# Patient Record
Sex: FEMALE | Race: BLACK OR AFRICAN AMERICAN | URBAN - NONMETROPOLITAN AREA
[De-identification: names, ages, dates, MRNs, and addresses within clinical notes are randomized per-mention and may not be internally consistent; named-entity substitution may affect disease eponyms.]

---

## 2023-04-12 ENCOUNTER — CLAIMS CREATED FROM THE CLAIM WINDOW (OUTPATIENT)
Dept: URBAN - NONMETROPOLITAN AREA CLINIC 2 | Facility: CLINIC | Age: 23
End: 2023-04-12
Payer: COMMERCIAL

## 2023-04-12 ENCOUNTER — LAB OUTSIDE AN ENCOUNTER (OUTPATIENT)
Dept: URBAN - NONMETROPOLITAN AREA CLINIC 2 | Facility: CLINIC | Age: 23
End: 2023-04-12

## 2023-04-12 ENCOUNTER — WEB ENCOUNTER (OUTPATIENT)
Dept: URBAN - NONMETROPOLITAN AREA CLINIC 2 | Facility: CLINIC | Age: 23
End: 2023-04-12

## 2023-04-12 DIAGNOSIS — R10.13 DYSPEPSIA: ICD-10-CM

## 2023-04-12 DIAGNOSIS — R14.0 ABDOMINAL BLOATING: ICD-10-CM

## 2023-04-12 DIAGNOSIS — R11.0 NAUSEA: ICD-10-CM

## 2023-04-12 DIAGNOSIS — R10.13 EPIGASTRIC ABDOMINAL PAIN: ICD-10-CM

## 2023-04-12 PROBLEM — 79922009: Status: ACTIVE | Noted: 2023-04-12

## 2023-04-12 PROCEDURE — 99244 OFF/OP CNSLTJ NEW/EST MOD 40: CPT

## 2023-04-12 PROCEDURE — 99204 OFFICE O/P NEW MOD 45 MIN: CPT

## 2023-04-12 NOTE — HPI-TODAY'S VISIT:
4/12/23 Vanessa presents for evaluation of epigastric discomfort referred to our office by Dr. Vinny Boone from [facility]. A copy of this note and recommendations will be forwarded to their office.  She states an onset in December of an inability to eat large portions at meals complete meals.  She will start eating and her stomach gets full feels like her brain is telling her she is still hungry and she continues to eat however after continuing to eat more and feeling her stomach even more this uncomfortable with epigastric bloating and pressure. Worse when she tried to increase to full plates of chicken, rice and pasta.  She was started on Prevacid at the [facility], however she states this worsened her symptoms as she noticed when she would get very full on the Prevacid she would feel nauseated. She denies odynophagia, dysphagia or symptoms of reflux including heartburn. 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 She takes no other medications other than cetirizine for allergies which she has been on for years. She has regular bowel movements that are reported normal daily, improved after starting L-glutamine supplement. She will be here for the summer, travelling occassionally to see her mom in [place]. Has plans to  go to sonography school after undergrad.  Declines Zofran for nausea. SP

## 2023-05-04 ENCOUNTER — TELEPHONE ENCOUNTER (OUTPATIENT)
Dept: URBAN - METROPOLITAN AREA CLINIC 35 | Facility: CLINIC | Age: 23
End: 2023-05-04

## 2023-06-05 ENCOUNTER — TELEPHONE ENCOUNTER (OUTPATIENT)
Dept: URBAN - NONMETROPOLITAN AREA CLINIC 2 | Facility: CLINIC | Age: 23
End: 2023-06-05

## 2023-06-18 ENCOUNTER — TELEPHONE ENCOUNTER (OUTPATIENT)
Dept: URBAN - METROPOLITAN AREA CLINIC 96 | Facility: CLINIC | Age: 23
End: 2023-06-18

## 2023-06-18 PROBLEM — 235675006: Status: ACTIVE | Noted: 2023-06-18

## 2023-07-11 ENCOUNTER — OFFICE VISIT (OUTPATIENT)
Dept: URBAN - NONMETROPOLITAN AREA CLINIC 2 | Facility: CLINIC | Age: 23
End: 2023-07-11
Payer: COMMERCIAL

## 2023-07-11 ENCOUNTER — DASHBOARD ENCOUNTERS (OUTPATIENT)
Age: 23
End: 2023-07-11

## 2023-07-11 VITALS
DIASTOLIC BLOOD PRESSURE: 77 MMHG | HEIGHT: 65 IN | SYSTOLIC BLOOD PRESSURE: 118 MMHG | WEIGHT: 130.2 LBS | BODY MASS INDEX: 21.69 KG/M2 | HEART RATE: 90 BPM

## 2023-07-11 DIAGNOSIS — R14.0 ABDOMINAL BLOATING: ICD-10-CM

## 2023-07-11 DIAGNOSIS — K31.84 GASTROPARESIS: ICD-10-CM

## 2023-07-11 DIAGNOSIS — R10.13 EPIGASTRIC ABDOMINAL PAIN: ICD-10-CM

## 2023-07-11 DIAGNOSIS — R11.0 NAUSEA: ICD-10-CM

## 2023-07-11 PROBLEM — 162031009: Status: ACTIVE | Noted: 2023-04-12

## 2023-07-11 PROBLEM — 116289008: Status: ACTIVE | Noted: 2023-04-12

## 2023-07-11 PROBLEM — 422587007: Status: ACTIVE | Noted: 2023-04-12

## 2023-07-11 PROCEDURE — 99213 OFFICE O/P EST LOW 20 MIN: CPT

## 2023-07-11 NOTE — HPI-TODAY'S VISIT:
4/12/23 Vanessa presents for evaluation of epigastric discomfort referred to our office by Dr. Vinny Boone from [facility]. A copy of this note and recommendations will be forwarded to their office.  She states an onset in December of an inability to eat large portions at meals complete meals.  She will start eating and her stomach gets full feels like her brain is telling her she is still hungry and she continues to eat however after continuing to eat more and feeling her stomach even more this uncomfortable with epigastric bloating and pressure. Worse when she tried to increase to full plates of chicken, rice and pasta.  She was started on Prevacid at the [facility], however she states this worsened her symptoms as she noticed when she would get very full on the Prevacid she would feel nauseated. She denies odynophagia, dysphagia or symptoms of reflux including heartburn. 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 She takes no other medications other than cetirizine for allergies which she has been on for years. She has regular bowel movements that are reported normal daily, improved after starting L-glutamine supplement. She will be here for the summer, travelling occassionally to see her mom in [place]. Has plans to  go to sonography school after undergrad.  Declines Zofran for nausea. SP  7/11/2023 Elsy presents to discuss her upcoming EGD and follow up foradvisements for gastroparesis. She has not seen improvements in her gastroparesis diet but has cut back on fiber somewhat. FDgard did not help symptoms as well. She would like to proceed with her EGD for full epigastric evaluation. She has some irregular stool habits and would like to have a Bm every day, agrees to trial bowel regimen.  SP

## 2023-07-13 ENCOUNTER — CLAIMS CREATED FROM THE CLAIM WINDOW (OUTPATIENT)
Dept: URBAN - METROPOLITAN AREA CLINIC 4 | Facility: CLINIC | Age: 23
End: 2023-07-13
Payer: COMMERCIAL

## 2023-07-13 ENCOUNTER — OFFICE VISIT (OUTPATIENT)
Dept: URBAN - NONMETROPOLITAN AREA SURGERY CENTER 1 | Facility: SURGERY CENTER | Age: 23
End: 2023-07-13
Payer: COMMERCIAL

## 2023-07-13 DIAGNOSIS — K31.89 OTHER DISEASES OF STOMACH AND DUODENUM: ICD-10-CM

## 2023-07-13 DIAGNOSIS — K21.9 ACID REFLUX: ICD-10-CM

## 2023-07-13 PROCEDURE — G8907 PT DOC NO EVENTS ON DISCHARG: HCPCS | Performed by: INTERNAL MEDICINE

## 2023-07-13 PROCEDURE — 88312 SPECIAL STAINS GROUP 1: CPT | Performed by: PATHOLOGY

## 2023-07-13 PROCEDURE — 43239 EGD BIOPSY SINGLE/MULTIPLE: CPT | Performed by: INTERNAL MEDICINE

## 2023-07-13 PROCEDURE — 88305 TISSUE EXAM BY PATHOLOGIST: CPT | Performed by: PATHOLOGY
